# Patient Record
Sex: FEMALE | Race: BLACK OR AFRICAN AMERICAN | NOT HISPANIC OR LATINO | URBAN - METROPOLITAN AREA
[De-identification: names, ages, dates, MRNs, and addresses within clinical notes are randomized per-mention and may not be internally consistent; named-entity substitution may affect disease eponyms.]

---

## 2024-09-23 ENCOUNTER — HOSPITAL ENCOUNTER (EMERGENCY)
Facility: OTHER | Age: 29
Discharge: HOME | End: 2024-09-23
Attending: EMERGENCY MEDICINE

## 2024-09-23 VITALS
HEIGHT: 66 IN | WEIGHT: 160 LBS | RESPIRATION RATE: 18 BRPM | TEMPERATURE: 98 F | DIASTOLIC BLOOD PRESSURE: 79 MMHG | BODY MASS INDEX: 25.71 KG/M2 | HEART RATE: 89 BPM | OXYGEN SATURATION: 100 % | SYSTOLIC BLOOD PRESSURE: 122 MMHG

## 2024-09-23 DIAGNOSIS — N75.1 BARTHOLIN'S GLAND ABSCESS: Primary | ICD-10-CM

## 2024-09-23 DIAGNOSIS — L98.9 SKIN LESION: ICD-10-CM

## 2024-09-23 LAB
B-HCG UR QL: NEGATIVE
CTP QC/QA: YES

## 2024-09-23 PROCEDURE — 81025 URINE PREGNANCY TEST: CPT | Performed by: PHYSICIAN ASSISTANT

## 2024-09-23 PROCEDURE — 56420 I&D BARTHOLINS GLAND ABSCESS: CPT

## 2024-09-23 PROCEDURE — 99284 EMERGENCY DEPT VISIT MOD MDM: CPT | Mod: 25

## 2024-09-23 PROCEDURE — 25000003 PHARM REV CODE 250: Performed by: NURSE PRACTITIONER

## 2024-09-23 PROCEDURE — 25000003 PHARM REV CODE 250: Performed by: PHYSICIAN ASSISTANT

## 2024-09-23 RX ORDER — LIDOCAINE HYDROCHLORIDE 10 MG/ML
5 INJECTION, SOLUTION INFILTRATION; PERINEURAL
Status: DISCONTINUED | OUTPATIENT
Start: 2024-09-23 | End: 2024-09-23 | Stop reason: HOSPADM

## 2024-09-23 RX ORDER — KETOROLAC TROMETHAMINE 10 MG/1
10 TABLET, FILM COATED ORAL
Status: COMPLETED | OUTPATIENT
Start: 2024-09-23 | End: 2024-09-23

## 2024-09-23 RX ORDER — SULFAMETHOXAZOLE AND TRIMETHOPRIM 800; 160 MG/1; MG/1
1 TABLET ORAL 2 TIMES DAILY
Qty: 14 TABLET | Refills: 0 | Status: SHIPPED | OUTPATIENT
Start: 2024-09-23 | End: 2024-09-30

## 2024-09-23 RX ORDER — HYDROCODONE BITARTRATE AND ACETAMINOPHEN 5; 325 MG/1; MG/1
1 TABLET ORAL EVERY 6 HOURS PRN
Qty: 5 TABLET | Refills: 0 | Status: SHIPPED | OUTPATIENT
Start: 2024-09-23

## 2024-09-23 RX ORDER — OXYCODONE AND ACETAMINOPHEN 5; 325 MG/1; MG/1
1 TABLET ORAL
Status: COMPLETED | OUTPATIENT
Start: 2024-09-23 | End: 2024-09-23

## 2024-09-23 RX ADMIN — KETOROLAC TROMETHAMINE 10 MG: 10 TABLET, FILM COATED ORAL at 12:09

## 2024-09-23 RX ADMIN — OXYCODONE HYDROCHLORIDE AND ACETAMINOPHEN 1 TABLET: 5; 325 TABLET ORAL at 12:09

## 2024-09-23 NOTE — ED PROVIDER NOTES
"     Source of History:  Patient    Chief complaint:  Pelvic Pain (Pt reporting pelvic pain x 1 day. Pt denies urinary symptoms. Pt endorses being at end of menstrual cycle. )      HPI:  Jennifer Morel is a 29 y.o. female presenting with right labial abscess for one day. Patient denies pelvic pain, urinary pain, burning, or discharge. She has not taken anything to help alleviate the symptoms and states this has never happened before. Patient reports she is at the end of her menstrual cycle. She also reports a bump on her right breast that has been there for months. She denies any pain or redness to the area. Denies fever, chills, headache, chest pain, URI symptoms, cough, SOB, N/V/D.     This is the extent to the patients complaints today here in the emergency department.    ROS: As per HPI and below:  Constitutional: No weight loss  ENT: Denies hearing loss or ear pain no throat pain  Eyes: No visual loss or changes  Cardiovascular: No chest pain or palpitations  Respiratory: No shortness of breath or cough  GI: No abdominal pain.   : Abscess on R labia majora   Skin: +skin lesion  Musculoskeletal: No arthralgias or myalgias  Heme/Onc: No fevers or chills  Neuro: No loss of consciousness headache or dizziness    Review of patient's allergies indicates:  No Known Allergies    PMH:  As per HPI and below:  History reviewed. No pertinent past medical history.  History reviewed. No pertinent surgical history.         Physical Exam:    /79 (BP Location: Left arm)   Pulse 89   Temp 98.1 °F (36.7 °C) (Oral)   Resp 18   Ht 5' 6" (1.676 m)   Wt 72.6 kg (160 lb)   SpO2 100%   Breastfeeding No   BMI 25.82 kg/m²   Nurse's notes vitals reviewed  General: Patient alert and oriented,well-developed, appears well-nourished  ENT: Oropharynx nonerythematous, oral mucosa moist,   Eyes: Pupils equally round and reactive to light, extraocular muscles are intact, normal conjunctiva, normal sclera  Cardiovascular: Regular " rate and rhythm no murmurs gallops or rubs  Respiratory: Clear to auscultation bilaterally without wheezing rhonchi or rales  GI: Soft nontender nondistended bowel sounds normal, no guarding, no rebound, non peritoneal  : Exam performed with chaperone Maria Elena [employer] student. Fluctuant abscess on right labia majora. Tender to palpation.   Musculoskeletal: Normocephalic atraumatic  Full strength and normal range of motion, no obvious deformities, no edema, normal cap refill  Skin: Warm and dry no rashes or lesions, no ecchymosis, no erythema. Right breast with nontender, flat discolored lesion approximately 1cm in size.  Neuro: Alert and oriented x3,  normal sensory, normal motor, no cerebellar deficits. Ambulates with a steady gait    Labs that have been ordered have been independently reviewed and interpreted by myself.        Initial Impression/ Differential Dx:  Urgent evaluation of female 29 y.o. presenting with right labia majora abscess. Patient is afebrile, not toxic appearing and hemodynamically stable. Fluctuant abscess on physical exam consistent Bartholin's abscess.  Will premedicate and Plan for I&D with antibiotic coverage.  Regarding skin lesions on right breast, no fluctuance, erythema requiring I&D or intervention.  Will have patient follow-up with PCP/derm    Differential diagnoses include but is not limited to:  Abscess, inflamed cyst, folliculitis,     MDM:     I&D per procedure note.  Patient tolerated well.  Patient placed on Bactrim, instructed to continue to apply heat and to follow up with OBGYN.  Patient discharged in good condition with outpatient follow-up. Patient educated on signs and symptoms to monitor for and when to return to ED. Patient verbalized understanding agrees with treatment plan. All questions and concerns addressed.          I & D - Incision and Drainage    Date/Time: 9/23/2024 2:17 PM  Location procedure was performed: [facility] EMERGENCY DEPARTMENT    Performed by: Stephanie Evans  EDNA MALDONADO  Authorized by: Thad Stock MD  Type: abscess  Body area: anogenital  Location details: Bartholin's gland  Anesthesia: local infiltration    Anesthesia:  Local Anesthetic: lidocaine 1% without epinephrine  Scalpel size: 11  Incision type: single straight  Complexity: simple  Drainage: pus  Drainage amount: copious  Wound treatment: incision and expression of material  Patient tolerance: Patient tolerated the procedure well with no immediate complications                 Diagnostic Impression:    1. Bartholin's gland abscess    2. Skin lesion         ED Disposition Condition    Discharge Good            ED Prescriptions       Medication Sig Dispense Start Date End Date Auth. Provider    sulfamethoxazole-trimethoprim 800-160mg (BACTRIM DS) 800-160 mg Tab Take 1 tablet by mouth 2 (two) times daily. for 7 days 14 tablet 9/23/2024 9/30/2024 Stephanie Evans NP    HYDROcodone-acetaminophen (NORCO) 5-325 mg per tablet Take 1 tablet by mouth every 6 (six) hours as needed for Pain. 5 tablet 9/23/2024 -- Stephanie Evans NP          Follow-up Information       Follow up With Specialties Details Why Contact Info    your gynecologist  Schedule an appointment as soon as possible for a visit       [facility]   [address] [address] [phone]             Stephanie Evans NP  09/23/24 [id number]

## 2024-09-23 NOTE — ED TRIAGE NOTES
Pt reports hard lump on inside of labia since yesterday, no abnormal discharge no abdominal or pelvic pain.

## 2024-09-23 NOTE — Clinical Note
"Jennifer Muñoz" Adriel was seen and treated in our emergency department on 9/23/2024.  She may return to work on 09/25/2024.       If you have any questions or concerns, please don't hesitate to call.      Stephanie Evans NP"